# Patient Record
Sex: MALE | Race: WHITE
[De-identification: names, ages, dates, MRNs, and addresses within clinical notes are randomized per-mention and may not be internally consistent; named-entity substitution may affect disease eponyms.]

---

## 2020-02-01 ENCOUNTER — HOSPITAL ENCOUNTER (EMERGENCY)
Dept: HOSPITAL 25 - UCCORT | Age: 35
Discharge: HOME | End: 2020-02-01
Payer: COMMERCIAL

## 2020-02-01 VITALS — DIASTOLIC BLOOD PRESSURE: 75 MMHG | SYSTOLIC BLOOD PRESSURE: 156 MMHG

## 2020-02-01 DIAGNOSIS — J06.9: Primary | ICD-10-CM

## 2020-02-01 PROCEDURE — G0463 HOSPITAL OUTPT CLINIC VISIT: HCPCS

## 2020-02-01 PROCEDURE — 87651 STREP A DNA AMP PROBE: CPT

## 2020-02-01 PROCEDURE — 99202 OFFICE O/P NEW SF 15 MIN: CPT

## 2020-02-01 NOTE — UC
FLU HPI





- HPI Summary


HPI Summary: 


34-year-old male comes in with a chief complaint of influenza-like symptoms for 

about 2 days.  He's had body aches fevers chills.  Over-the-counter medicine 

does help with symptoms.  Has had been feeling ill with some runny nose for 

approximately 5 days.  No complaint of any shortness of breath.





- History of Current Complaint


Chief Complaint: UCGeneralIllness


Stated Complaint: FLU SYMP


Time Seen by Provider: 02/01/20 15:41


Pain Intensity: 6





- Allergy/Home Medications


Allergies/Adverse Reactions: 


 Allergies











Allergy/AdvReac Type Severity Reaction Status Date / Time


 


No Known Allergies Allergy   Verified 02/01/20 15:40











Home Medications: 


 Home Medications





DULoxetine DR CAP* [Cymbalta CAP*] 30 mg PO DAILY 02/01/20 [History Confirmed 02 /01/20]


Meloxicam [Mobic] 15 mg PO DAILY 02/01/20 [History Confirmed 02/01/20]


Oxycodone HCl 5 mg PO DAILY 02/01/20 [History Confirmed 02/01/20]











PMH/Surg Hx/FS Hx/Imm Hx


Previously Healthy: Yes





- Surgical History


Surgical History: Yes


Surgery Procedure, Year, and Place: Cyst removed from tailbone.  appendectomy 

2014





- Social History


Alcohol Use: None


Substance Use Type: None


Smoking Status (MU): Never Smoked Tobacco


Type: eCigarettes





Review of Systems


All Other Systems Reviewed And Are Negative: Yes


Constitutional: Positive: Fever, Other - see hpi


Skin: Positive: Negative


Eyes: Positive: Negative


ENT: Positive: Nasal Discharge, Sinus Congestion


Respiratory: Positive: Negative


Cardiovascular: Positive: Negative


Gastrointestinal: Positive: Negative


Motor: Positive: Negative


Neurovascular: Positive: Negative


Musculoskeletal: Positive: Myalgia


Neurological: Positive: Negative


Psychological: Positive: Negative


Is Patient Immunocompromised?: No





Physical Exam


Triage Information Reviewed: Yes


Appearance: No Pain Distress, Well-Nourished, Ill-Appearing - mild


Vital Signs: 


 Initial Vital Signs











Temp  97.9 F   02/01/20 15:42


 


Pulse  75   02/01/20 15:42


 


Resp  15   02/01/20 15:42


 


BP  156/75   02/01/20 15:42


 


Pulse Ox  98   02/01/20 15:42











Vital Signs Reviewed: Yes


Eye Exam: Normal


Eyes: Positive: Conjunctiva Clear


ENT: Positive: Pharynx normal, Nasal congestion, Nasal drainage, TMs normal


Neck: Positive: Supple


Respiratory: Positive: Lungs clear, Normal breath sounds, No respiratory 

distress


Cardiovascular: Positive: RRR


Musculoskeletal: Positive: Strength Intact, ROM Intact


Neurological: Positive: Alert, Muscle Tone Normal


Psychological: Positive: Normal Response To Family, Age Appropriate Behavior


Skin Exam: Normal





Flu Course/Dx





- Course


Course Of Treatment: 





DISCUSSED VIRAL VERSES BACTERIAL INFECTIONS AND THE ROLE OF ANTIBIOTICS.


THE PATIENT PREFERS TO BE ON ANTIBIOTICS AT THIS TIME.











- Differential Dx/Diagnosis


Provider Diagnosis: 


 Upper respiratory infection, Influenza-like illness








Discharge ED





- Sign-Out/Discharge


Documenting (check all that apply): Patient Departure


All imaging exams completed and their final reports reviewed: No Studies





- Discharge Plan


Condition: Stable


Disposition: HOME


Prescriptions: 


Amoxicillin PO (*) [Amoxicillin 875 MG (*)] 875 mg PO BID #20 tab


Patient Education Materials:  Upper Respiratory Infection (ED)


Forms:  *Work Release


Referrals: 


Brigido Baez MD [Primary Care Provider] - 


Additional Instructions: 


FOLLOW UP WITH YOUR DOCTOR IF NOT COMPLETELY IMPROVED.


GET REEVALUATED SOONER IF NOT IMPROVING OR WORSE OR ANY QUESTIONS OR CONCERNS.








- Billing Disposition and Condition


Condition: STABLE


Disposition: Home